# Patient Record
Sex: MALE | Race: WHITE | NOT HISPANIC OR LATINO | URBAN - METROPOLITAN AREA
[De-identification: names, ages, dates, MRNs, and addresses within clinical notes are randomized per-mention and may not be internally consistent; named-entity substitution may affect disease eponyms.]

---

## 2021-10-26 ENCOUNTER — EMERGENCY (EMERGENCY)
Facility: HOSPITAL | Age: 41
LOS: 1 days | Discharge: HOME | End: 2021-10-26
Admitting: STUDENT IN AN ORGANIZED HEALTH CARE EDUCATION/TRAINING PROGRAM
Payer: COMMERCIAL

## 2021-10-26 VITALS
DIASTOLIC BLOOD PRESSURE: 75 MMHG | OXYGEN SATURATION: 99 % | HEART RATE: 75 BPM | SYSTOLIC BLOOD PRESSURE: 118 MMHG | RESPIRATION RATE: 18 BRPM

## 2021-10-26 VITALS
HEART RATE: 85 BPM | TEMPERATURE: 98 F | OXYGEN SATURATION: 100 % | SYSTOLIC BLOOD PRESSURE: 107 MMHG | RESPIRATION RATE: 16 BRPM | HEIGHT: 72 IN | WEIGHT: 171.96 LBS | DIASTOLIC BLOOD PRESSURE: 70 MMHG

## 2021-10-26 DIAGNOSIS — Z23 ENCOUNTER FOR IMMUNIZATION: ICD-10-CM

## 2021-10-26 DIAGNOSIS — S01.112A LACERATION WITHOUT FOREIGN BODY OF LEFT EYELID AND PERIOCULAR AREA, INITIAL ENCOUNTER: ICD-10-CM
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
## 2021-10-26 PROCEDURE — 12011 RPR F/E/E/N/L/M 2.5 CM/<: CPT

## 2021-10-26 PROCEDURE — 90471 IMMUNIZATION ADMIN: CPT

## 2021-10-26 PROCEDURE — 90715 TDAP VACCINE 7 YRS/> IM: CPT

## 2021-10-26 PROCEDURE — 99283 EMERGENCY DEPT VISIT LOW MDM: CPT | Mod: 25

## 2021-10-26 RX ORDER — TETANUS TOXOID, REDUCED DIPHTHERIA TOXOID AND ACELLULAR PERTUSSIS VACCINE, ADSORBED 5; 2.5; 8; 8; 2.5 [IU]/.5ML; [IU]/.5ML; UG/.5ML; UG/.5ML; UG/.5ML
0.5 SUSPENSION INTRAMUSCULAR ONCE
Refills: 0 | Status: COMPLETED | OUTPATIENT
Start: 2021-10-26 | End: 2021-10-26

## 2021-10-26 RX ADMIN — TETANUS TOXOID, REDUCED DIPHTHERIA TOXOID AND ACELLULAR PERTUSSIS VACCINE, ADSORBED 0.5 MILLILITER(S): 5; 2.5; 8; 8; 2.5 SUSPENSION INTRAMUSCULAR at 21:13

## 2021-10-26 NOTE — ED PROVIDER NOTE - CLINICAL SUMMARY MEDICAL DECISION MAKING FREE TEXT BOX
Left eyebrow laceration. No evidence of globe injury or any vision complaints.  No evidence of ICH.  Laceration not under skin tension.  Plan: update tetanus, DermaBond laceration repair.

## 2021-10-26 NOTE — ED PROVIDER NOTE - NSFOLLOWUPINSTRUCTIONS_ED_ALL_ED_FT
Follow up with primary care physician in 1-2 days.  Keep the DermaBond (skin adhesive) dry for next 12-24 hours.      Return to the Emergency Department if you have any new or worsening symptoms, or if you have any concerns.  =============================    Skin Adhesive Care    WHAT YOU NEED TO KNOW:    Skin adhesive is medical glue used to close wounds. It is a substitute for staples and stitches. Skin adhesive wound closures take less time and do not require anesthesia. You have less pain and a lower risk of infection than with staples or stitches. Skin adhesive will fall off after the wound is healed.     DISCHARGE INSTRUCTIONS:    Self-care:   •Keep your wound clean and dry for 1 to 5 days. You can shower 24 hours after the skin adhesive is applied. Lightly pat your wound dry after you shower.      •Do not soak your wound in water, such as in a bath or hot tub.      •Do not scrub your wound or pick at the adhesive. This can make your wound reopen.       •Do not apply ointments to your wound. These include antibiotic and other ointments that contain petroleum jelly. These products will remove skin adhesive and reopen your wound.       Follow up with your doctor as directed: Write down your questions so you remember to ask them during your visits.     Contact your healthcare provider if:   •You have a fever.       •Your wound is red and warm to touch.       •You have questions or concerns about your condition or care.       Return to the emergency department if:   •Your wound has fluid draining from it.       •Your wound opens.   =========================    Head Injury    WHAT YOU NEED TO KNOW:    A head injury can include your scalp, face, skull, or brain and range from mild to severe. Effects can appear immediately after the injury or develop later. The effects may last a short time or be permanent. Healthcare providers may want to check your recovery over time. Treatment may change as you recover or develop new health problems from the head injury.    DISCHARGE INSTRUCTIONS:    Call your local emergency number (911 in the [place]), or have someone else call if:   •You cannot be woken.      •You have a seizure.      •You stop responding to others or you faint.      •You have blurry or double vision.      •Your speech becomes slurred or confused.      •You have arm or leg weakness, loss of feeling, or new problems with coordination.      •Your pupils are larger than usual, or one pupil is a different size than the other.      •You have blood or clear fluid coming out of your ears or nose.      Return to the emergency department if:   •You have repeated or forceful vomiting.      •You feel confused.      •Your headache gets worse or becomes severe.      •You or someone caring for you notices that you are harder to wake than usual.      Call your doctor if:   •Your symptoms last longer than 6 weeks after the injury.      •You have questions or concerns about your condition or care.      Medicines:   •Acetaminophen decreases pain and fever. It is available without a doctor's order. Ask how much to take and how often to take it. Follow directions. Read the labels of all other medicines you are using to see if they also contain acetaminophen, or ask your doctor or pharmacist. Acetaminophen can cause liver damage if not taken correctly. Do not use more than 4 grams (4,000 milligrams) total of acetaminophen in one day.       •Take your medicine as directed. Contact your healthcare provider if you think your medicine is not helping or if you have side effects. Tell him or her if you are allergic to any medicine. Keep a list of the medicines, vitamins, and herbs you take. Include the amounts, and when and why you take them. Bring the list or the pill bottles to follow-up visits. Carry your medicine list with you in case of an emergency.      Self-care:   •Rest or do quiet activities. Limit your time watching TV, using the computer, or doing tasks that require a lot of thinking. Slowly return to your normal activities as directed. Do not play sports or do activities that may cause you to get hit in the head. Ask your healthcare provider when you can return to sports.      •Apply ice on your head for 15 to 20 minutes every hour or as directed. Use an ice pack, or put crushed ice in a plastic bag. Cover it with a towel before you apply it to your skin. Ice helps prevent tissue damage and decreases swelling and pain.      •Have someone stay with you for 24 hours , or as directed. This person can monitor you for problems and call for help if needed. When you are awake, the person should ask you a few questions every few hours to see if you are thinking clearly. An example is to ask your name or address.      Prevent another head injury:   •Wear a helmet that fits properly. Do this when you play sports, or ride a bike, scooter, or skateboard. Helmets help decrease your risk for a serious head injury. Talk to your healthcare provider about other ways you can protect yourself if you play sports.      •Wear your seatbelt every time you are in a car. This helps lower your risk for a head injury if you are in a car accident.      Follow up with your doctor as directed: Write down your questions so you remember to ask them during your visits.  ===================  Diphtheria/Acellular Pertussis/Tetanus Booster Vaccine (Tdap) (Injection) (Injectable) - Med Essential Fact Sheet             Rx static image Diphtheria/Acellular Pertussis/Tetanus Booster Vaccine (Tdap) (Adacel, Boostrix) - (By injection)   Why this medicine is used:  Protects against infections caused by tetanus, diphtheria, or pertussis (whooping cough).    Contact a nurse or doctor right away if you have:•Seizures, lightheadedness, fainting, changes in vision  •Fever over 105 degrees F, sudden numbness or weakness in your arms or legs  •Numbness, tingling, or burning pain in your hands, arms, legs, or feet  •Severe pain, redness, or swelling where the shot was given       Common side effects:•Headache, tiredness  •Mild pain, redness, or swelling where the shot was given  •Mild nausea, vomiting, diarrhea, stomach pain

## 2021-10-26 NOTE — ED ADULT TRIAGE NOTE - CHIEF COMPLAINT QUOTE
cut to left eyebrow; I got elbowed while playing basketball this evening sustaining cut to my eyebrow; denies LOC, no head pain; unknown tetanus status

## 2021-10-26 NOTE — ED PROVIDER NOTE - OBJECTIVE STATEMENT
42 yo m without significant pmhx c/o left eyebrow laceration; was playing basketball tonight when he was elbowed to the left eyebrow.  No LOC, confusion/amnesia, headache, N/V, vision changes, neck pain, paresthesias.  Unknown last tetanus shot.  Fully vaccinated for Covid in March 2021.  No blood thinners.    Sustained a right sided "black eye" last week also while playing basketball, no vision complaints or any complaints related to that injury.

## 2021-10-26 NOTE — ED PROVIDER NOTE - PHYSICAL EXAMINATION
GENERAL: NAD  HEAD: 2 cm clean, superficial linear laceration to left eye, parallel and just under the eyebrow, along orbital rim.    EYE:  PERRLA. EOMI.  AC clear.  No orbital swelling, deformity, or step-off.  Left eyebrow lac as noted above.  Aging right infraorbital ecchymosis.    ENT:  No rhinorrhea. No otorrhea or hemotympanum.    CV:  RRR  PULM: Normal work of breathing.  GI:  :  SKIN: Normal color and turgor.    MSK: FROM neck without pain, no midline neck tenderness.    NEURO: alert and oriented x 3.  CN intact, speech clear, MAEW, gait steady.